# Patient Record
Sex: FEMALE | Race: OTHER | HISPANIC OR LATINO
[De-identification: names, ages, dates, MRNs, and addresses within clinical notes are randomized per-mention and may not be internally consistent; named-entity substitution may affect disease eponyms.]

---

## 2018-02-27 ENCOUNTER — APPOINTMENT (OUTPATIENT)
Dept: ANTEPARTUM | Facility: CLINIC | Age: 43
End: 2018-02-27
Payer: COMMERCIAL

## 2018-02-27 ENCOUNTER — ASOB RESULT (OUTPATIENT)
Age: 43
End: 2018-02-27

## 2018-02-27 PROBLEM — Z00.00 ENCOUNTER FOR PREVENTIVE HEALTH EXAMINATION: Status: ACTIVE | Noted: 2018-02-27

## 2018-02-27 PROCEDURE — 76857 US EXAM PELVIC LIMITED: CPT | Mod: 59

## 2018-02-27 PROCEDURE — 76830 TRANSVAGINAL US NON-OB: CPT

## 2023-12-17 ENCOUNTER — EMERGENCY (EMERGENCY)
Facility: HOSPITAL | Age: 48
LOS: 1 days | Discharge: HOME | End: 2023-12-17
Attending: EMERGENCY MEDICINE
Payer: COMMERCIAL

## 2023-12-17 VITALS
OXYGEN SATURATION: 98 % | SYSTOLIC BLOOD PRESSURE: 156 MMHG | DIASTOLIC BLOOD PRESSURE: 108 MMHG | HEART RATE: 89 BPM | TEMPERATURE: 99 F | WEIGHT: 175.93 LBS | RESPIRATION RATE: 20 BRPM | HEIGHT: 60 IN

## 2023-12-17 DIAGNOSIS — Z98.51 TUBAL LIGATION STATUS: Chronic | ICD-10-CM

## 2023-12-17 PROCEDURE — 73630 X-RAY EXAM OF FOOT: CPT | Mod: 26,RT

## 2023-12-17 PROCEDURE — 73610 X-RAY EXAM OF ANKLE: CPT | Mod: 26,RT

## 2023-12-17 PROCEDURE — 99284 EMERGENCY DEPT VISIT MOD MDM: CPT

## 2023-12-17 PROCEDURE — 73610 X-RAY EXAM OF ANKLE: CPT

## 2023-12-17 PROCEDURE — 73630 X-RAY EXAM OF FOOT: CPT

## 2023-12-17 NOTE — ED PROVIDER NOTE - PHYSICAL EXAMINATION
Gen: No acute distress, non toxic  Neuro: A&O x 3, moving all 4 extremities.   MSK: right foot doral ttp throughout, no point tenderness. pulses, sensation, rom intact.   Skin: No rashes. intact and perfused.

## 2023-12-17 NOTE — ED ADULT TRIAGE NOTE - AS TEMP SITE
Immunohistochemistry ([id number] and [id number]) billing is not performed here. Please use the Immunohistochemistry Stain Billing plan to accomplish this. oral

## 2023-12-17 NOTE — ED PROCEDURE NOTE - CPROC ED TIME OUT STATEMENT1
“Patient's name, , procedure and correct site were confirmed during the [place] Timeout.” “Patient's name, , procedure and correct site were confirmed during the [place] Timeout.”

## 2023-12-17 NOTE — ED PROVIDER NOTE - OBJECTIVE STATEMENT
48F with pmh hypothyroid presenting with right foot pain. Pt states she was dancing and another women accidently stepped on her foot with high heals. pt states she is not able to put pressure on her foot.   Denies numbness/ tingling, other injuries.

## 2023-12-17 NOTE — ED PROVIDER NOTE - CARE PROVIDER_API CALL
Larry Moreno  Podiatric Medicine and Surgery  [address] [place] [address]  Phone: [phone]  Fax: [phone]  Follow Up Time:    Larry Moreno  Podiatric Medicine and Surgery  [address] [place] [address]  Phone: [phone]  Fax: [phone]  Follow Up Time:

## 2023-12-17 NOTE — ED PROVIDER NOTE - NS ED ATTENDING STATEMENT MOD
This was a shared visit with the BAUDILIO. I reviewed and verified the documentation and independently performed the documented:

## 2023-12-17 NOTE — ED PROVIDER NOTE - CLINICAL SUMMARY MEDICAL DECISION MAKING FREE TEXT BOX
48F with right foot pain. tenderness throughout, no point ttp. meds, xr. 48F with right foot pain. tenderness throughout, no point ttp. meds, xr.  XR without acute findings. placed in ortho shoe. to fu with podiatry. given return precautions.

## 2023-12-17 NOTE — ED PROVIDER NOTE - PATIENT PORTAL LINK FT
You can access the FollowMyHealth Patient Portal offered by [facility] by registering at the following website: [email or web address]. By joining [employer]’s FollowMyHealth portal, you will also be able to view your health information using other applications (apps) compatible with our system. You can access the FollowMyHealth Patient Portal offered by [facility] by registering at the following website: [email or web address]. By joining [email or web address]’s FollowMyHealth portal, you will also be able to view your health information using other applications (apps) compatible with our system.

## 2023-12-17 NOTE — ED ADULT NURSE NOTE - CHIEF COMPLAINT QUOTE
Patient presents to ED with c/o right foot pain and swelling.  Per patient, she was dancing when another woman stepped on her foot with high heels about 20 min PTA.  Right foot noted to be swollen.  Unable to bear weight and move ankle.

## 2024-07-17 ENCOUNTER — EMERGENCY (EMERGENCY)
Facility: HOSPITAL | Age: 49
LOS: 1 days | Discharge: HOME | End: 2024-07-17
Attending: EMERGENCY MEDICINE
Payer: COMMERCIAL

## 2024-07-17 VITALS
OXYGEN SATURATION: 98 % | SYSTOLIC BLOOD PRESSURE: 149 MMHG | TEMPERATURE: 99 F | RESPIRATION RATE: 18 BRPM | WEIGHT: 185.19 LBS | DIASTOLIC BLOOD PRESSURE: 100 MMHG | HEART RATE: 78 BPM

## 2024-07-17 DIAGNOSIS — Z98.51 TUBAL LIGATION STATUS: Chronic | ICD-10-CM

## 2024-07-17 PROCEDURE — 99283 EMERGENCY DEPT VISIT LOW MDM: CPT

## 2024-07-17 RX ORDER — NEOMYCIN/POLYMYXIN B/HYDROCORT 3.5-10K-1
4 SUSPENSION, DROPS(FINAL DOSAGE FORM)(ML) OTIC (EAR) ONCE
Refills: 0 | Status: COMPLETED | OUTPATIENT
Start: 2024-07-17 | End: 2024-07-17

## 2024-07-17 RX ADMIN — Medication 4 DROP(S): at 09:07

## 2024-07-17 NOTE — ED ADULT NURSE NOTE - OBJECTIVE STATEMENT
47 YO female presented to the ED with c/o pain in right ear x1 week. patient stated she was seen by urgent care and was given acetic acid 2% soln. ear drops to be taken every day, with no Releif. c/o sore throat and severe ear pain in right ear. pain in left ear x3 days. Denies fever, chills, runny nose, headache or discharge from ears or exposure to sick contacts. medications given as per orders.

## 2024-07-17 NOTE — ED PROVIDER NOTE - WET READ LAUNCH FT
There are no Wet Read(s) to document. Nasalis-Muscle-Based Myocutaneous Island Pedicle Flap Text: Using a #15 blade, an incision was made around the donor flap to the level of the nasalis muscle. Wide lateral undermining was then performed in both the subcutaneous plane above the nasalis muscle, and in a submuscular plane just above periosteum. This allowed the formation of a free nasalis muscle axial pedicle (based on the angular artery) which was still attached to the actual cutaneous flap, increasing its mobility and vascular viability. Hemostasis was obtained with pinpoint electrocoagulation. The flap was mobilized and tunnelled into position and the pivotal anchor points positioned and stabilized with buried interrupted sutures. Subcutaneous and dermal tissues were closed in a multilayered fashion with sutures. Tissue redundancies were excised, and the epidermal edges were apposed without significant tension and sutured with sutures.  The primary and secondary sites were closed with subcutaneous, dermal and epidermal sutures.

## 2024-07-17 NOTE — ED PROVIDER NOTE - ATTENDING APP SHARED VISIT CONTRIBUTION OF CARE
I performed a history and physical exam of the patient and discussed their management with the advanced care provider. I reviewed the advanced care provider's note and agree with the documented findings and plan of care. My medical decision making and objective findings are found below.     Patient with bilateral acute otitis externa, treated with topical antibiotics, stable for DC home with ENT follow-up.

## 2024-07-17 NOTE — ED ADULT NURSE NOTE - NSFALLUNIVINTERV_ED_ALL_ED
Bed/Stretcher in lowest position, wheels locked, appropriate side rails in place/Call bell, personal items and telephone in reach/Instruct patient to call for assistance before getting out of bed/chair/stretcher/Non-slip footwear applied when patient is off stretcher/[place] to call system/Physically safe environment - no spills, clutter or unnecessary equipment/Purposeful proactive rounding/Room/bathroom lighting operational, light cord in reach

## 2024-07-17 NOTE — ED PROVIDER NOTE - PATIENT PORTAL LINK FT
You can access the FollowMyHealth Patient Portal offered by [facility] by registering at the following website: [email or web address]. By joining [employer]’s FollowMyHealth portal, you will also be able to view your health information using other applications (apps) compatible with our system.

## 2024-07-17 NOTE — ED PROVIDER NOTE - CLINICAL SUMMARY MEDICAL DECISION MAKING FREE TEXT BOX
47yo F no PMHx presents to ED c/o b/l ear pain x 1 week. Pt well appearing, non-toxic. Afebrile. Using acetic acid drops without relief. Exam consistent with otitis externa. Given cortisporin otic suspension drops and advised to f/u with ENT within 3-4 days. return precautions discussed. Pt hypertensive in triage but no cp, sob or headache, advised to f/u with PMD for further evaluation and management of BP.

## 2024-07-17 NOTE — ED PROVIDER NOTE - NSFOLLOWUPINSTRUCTIONS_ED_ALL_ED_FT
- Return to the ED for any new or worsening symptoms.   - Please complete course of ear drops, 4 drops to each ear 4x/day for 7-10 days  - Follow-up with ENT doctor provided  - Follow-up with your primary doctor for further evaluation and management of blood pressure     Otitis Externa    Otitis externa is a bacterial or fungal infection of the outer ear canal. This is the area from the eardrum to the outside of the ear. Otitis externa is sometimes called "swimmer's ear." Causes include swimming in dirty water, moisture remaining in ear after swimming or bathing, trauma to the ear, objects stuck in the ear, or cuts or scrapes in the outside of the ear. Symptoms include itching, pain, swelling, redness in the ear canal, and fluid coming from the ear. To prevent recurrence of otitis externa, keep your ear dry, avoid scratching or putting objects inside your ear including cotton swabs, and avoid swimming in polluted water or poorly chlorinated pools.    SEEK IMMEDIATE MEDICAL CARE IF YOU HAVE ANY OF THE FOLLOWING SYMPTOMS: fever, worsening symptoms, headache, redness/swelling/pain over the bone behind your ear.

## 2024-07-17 NOTE — ED PROVIDER NOTE - CARE PROVIDER_API CALL
Adam Lopez  Head/Neck Surgery  [address] [place] [address], [address]  [place] [address]  Phone: [phone]  Fax: [phone]  Follow Up Time:

## 2024-07-17 NOTE — ED PROVIDER NOTE - PHYSICAL EXAMINATION
Gen: No acute distress, non toxic  HENT: NCAT, Mucous membranes moist, Oropharynx without exudates, uvula midline. +B/L edematous ear canals R>>L with purulent debris in right ear canal. no mastoid tenderness.  Lymph: +enlarged, tender R submandibular lymph node  Eyes: pink conjunctivae, EOMI, PERRL  Neuro: A&O x 3, sensorimotor intact without deficits   MSK: No spine or joint tenderness to palpation, Full ROM ext x 4  Skin: No rashes. intact and perfused.

## 2024-07-17 NOTE — ED PROVIDER NOTE - OBJECTIVE STATEMENT
47yo F PMHx hypothyroidism presents to ED c/o b/l ear pain x 1 week. States right ear has been hurting for past week so saw doctor who put her on acetic acid drops which she has been using without relief. Now c/o pain to both ears R>L and muffled hearing on right. Denies fever, chills, headache, dizziness, purulent drainage, exposure to water/swimming.

## 2024-07-22 DIAGNOSIS — H60.93 UNSPECIFIED OTITIS EXTERNA, BILATERAL: ICD-10-CM

## 2024-07-22 DIAGNOSIS — E03.9 HYPOTHYROIDISM, UNSPECIFIED: ICD-10-CM

## 2024-07-22 DIAGNOSIS — H92.03 OTALGIA, BILATERAL: ICD-10-CM

## 2024-07-22 DIAGNOSIS — I10 ESSENTIAL (PRIMARY) HYPERTENSION: ICD-10-CM
